# Patient Record
Sex: FEMALE | Race: WHITE | NOT HISPANIC OR LATINO | URBAN - METROPOLITAN AREA
[De-identification: names, ages, dates, MRNs, and addresses within clinical notes are randomized per-mention and may not be internally consistent; named-entity substitution may affect disease eponyms.]

---

## 2017-01-12 ENCOUNTER — COMMUNICATION - HEALTHEAST (OUTPATIENT)
Dept: FAMILY MEDICINE | Facility: CLINIC | Age: 47
End: 2017-01-12

## 2017-01-12 DIAGNOSIS — K21.9 GERD (GASTROESOPHAGEAL REFLUX DISEASE): ICD-10-CM

## 2017-01-23 ENCOUNTER — COMMUNICATION - HEALTHEAST (OUTPATIENT)
Dept: FAMILY MEDICINE | Facility: CLINIC | Age: 47
End: 2017-01-23

## 2017-01-23 DIAGNOSIS — F90.9 ADHD (ATTENTION DEFICIT HYPERACTIVITY DISORDER): ICD-10-CM

## 2017-02-03 ENCOUNTER — COMMUNICATION - HEALTHEAST (OUTPATIENT)
Dept: FAMILY MEDICINE | Facility: CLINIC | Age: 47
End: 2017-02-03

## 2017-02-03 DIAGNOSIS — K21.9 GERD (GASTROESOPHAGEAL REFLUX DISEASE): ICD-10-CM

## 2017-02-09 ENCOUNTER — RECORDS - HEALTHEAST (OUTPATIENT)
Dept: ADMINISTRATIVE | Facility: OTHER | Age: 47
End: 2017-02-09

## 2017-02-14 ENCOUNTER — COMMUNICATION - HEALTHEAST (OUTPATIENT)
Dept: FAMILY MEDICINE | Facility: CLINIC | Age: 47
End: 2017-02-14

## 2017-02-15 ENCOUNTER — COMMUNICATION - HEALTHEAST (OUTPATIENT)
Dept: FAMILY MEDICINE | Facility: CLINIC | Age: 47
End: 2017-02-15

## 2017-02-21 ENCOUNTER — COMMUNICATION - HEALTHEAST (OUTPATIENT)
Dept: FAMILY MEDICINE | Facility: CLINIC | Age: 47
End: 2017-02-21

## 2017-02-21 ENCOUNTER — RECORDS - HEALTHEAST (OUTPATIENT)
Dept: ADMINISTRATIVE | Facility: OTHER | Age: 47
End: 2017-02-21

## 2017-02-21 DIAGNOSIS — F90.9 ADHD (ATTENTION DEFICIT HYPERACTIVITY DISORDER): ICD-10-CM

## 2017-02-23 ENCOUNTER — COMMUNICATION - HEALTHEAST (OUTPATIENT)
Dept: FAMILY MEDICINE | Facility: CLINIC | Age: 47
End: 2017-02-23

## 2017-03-23 ENCOUNTER — COMMUNICATION - HEALTHEAST (OUTPATIENT)
Dept: FAMILY MEDICINE | Facility: CLINIC | Age: 47
End: 2017-03-23

## 2017-03-23 DIAGNOSIS — F90.9 ADHD (ATTENTION DEFICIT HYPERACTIVITY DISORDER): ICD-10-CM

## 2017-04-01 ENCOUNTER — COMMUNICATION - HEALTHEAST (OUTPATIENT)
Dept: SCHEDULING | Facility: CLINIC | Age: 47
End: 2017-04-01

## 2017-04-01 DIAGNOSIS — F90.9 ADHD (ATTENTION DEFICIT HYPERACTIVITY DISORDER): ICD-10-CM

## 2017-04-02 ENCOUNTER — COMMUNICATION - HEALTHEAST (OUTPATIENT)
Dept: FAMILY MEDICINE | Facility: CLINIC | Age: 47
End: 2017-04-02

## 2017-04-02 DIAGNOSIS — K21.9 GERD (GASTROESOPHAGEAL REFLUX DISEASE): ICD-10-CM

## 2017-05-02 ENCOUNTER — COMMUNICATION - HEALTHEAST (OUTPATIENT)
Dept: FAMILY MEDICINE | Facility: CLINIC | Age: 47
End: 2017-05-02

## 2017-05-02 DIAGNOSIS — F90.9 ADHD (ATTENTION DEFICIT HYPERACTIVITY DISORDER): ICD-10-CM

## 2017-05-27 ENCOUNTER — COMMUNICATION - HEALTHEAST (OUTPATIENT)
Dept: SCHEDULING | Facility: CLINIC | Age: 47
End: 2017-05-27

## 2017-05-27 DIAGNOSIS — F90.9 ADHD (ATTENTION DEFICIT HYPERACTIVITY DISORDER): ICD-10-CM

## 2017-06-08 ENCOUNTER — COMMUNICATION - HEALTHEAST (OUTPATIENT)
Dept: FAMILY MEDICINE | Facility: CLINIC | Age: 47
End: 2017-06-08

## 2017-06-29 ENCOUNTER — COMMUNICATION - HEALTHEAST (OUTPATIENT)
Dept: FAMILY MEDICINE | Facility: CLINIC | Age: 47
End: 2017-06-29

## 2017-06-29 DIAGNOSIS — F90.9 ADHD (ATTENTION DEFICIT HYPERACTIVITY DISORDER): ICD-10-CM

## 2017-07-25 ENCOUNTER — COMMUNICATION - HEALTHEAST (OUTPATIENT)
Dept: SCHEDULING | Facility: CLINIC | Age: 47
End: 2017-07-25

## 2017-07-25 DIAGNOSIS — F90.9 ADHD (ATTENTION DEFICIT HYPERACTIVITY DISORDER): ICD-10-CM

## 2017-07-27 ENCOUNTER — COMMUNICATION - HEALTHEAST (OUTPATIENT)
Dept: FAMILY MEDICINE | Facility: CLINIC | Age: 47
End: 2017-07-27

## 2017-07-27 DIAGNOSIS — K21.9 GERD (GASTROESOPHAGEAL REFLUX DISEASE): ICD-10-CM

## 2017-07-31 ENCOUNTER — COMMUNICATION - HEALTHEAST (OUTPATIENT)
Dept: FAMILY MEDICINE | Facility: CLINIC | Age: 47
End: 2017-07-31

## 2017-09-15 ENCOUNTER — OFFICE VISIT - HEALTHEAST (OUTPATIENT)
Dept: FAMILY MEDICINE | Facility: CLINIC | Age: 47
End: 2017-09-15

## 2017-09-15 ENCOUNTER — COMMUNICATION - HEALTHEAST (OUTPATIENT)
Dept: FAMILY MEDICINE | Facility: CLINIC | Age: 47
End: 2017-09-15

## 2017-09-15 DIAGNOSIS — I10 HYPERTENSION: ICD-10-CM

## 2017-09-15 DIAGNOSIS — Z12.31 VISIT FOR SCREENING MAMMOGRAM: ICD-10-CM

## 2017-09-15 DIAGNOSIS — F90.9 ADHD (ATTENTION DEFICIT HYPERACTIVITY DISORDER): ICD-10-CM

## 2017-09-15 DIAGNOSIS — K21.9 GERD (GASTROESOPHAGEAL REFLUX DISEASE): ICD-10-CM

## 2017-09-15 RX ORDER — LISINOPRIL 10 MG/1
10 TABLET ORAL DAILY
Qty: 30 TABLET | Refills: 11 | Status: SHIPPED | OUTPATIENT
Start: 2017-09-15 | End: 2018-09-15

## 2017-09-15 ASSESSMENT — MIFFLIN-ST. JEOR: SCORE: 1258.38

## 2017-09-18 ENCOUNTER — COMMUNICATION - HEALTHEAST (OUTPATIENT)
Dept: FAMILY MEDICINE | Facility: CLINIC | Age: 47
End: 2017-09-18

## 2017-10-04 ENCOUNTER — COMMUNICATION - HEALTHEAST (OUTPATIENT)
Dept: FAMILY MEDICINE | Facility: CLINIC | Age: 47
End: 2017-10-04

## 2017-10-10 ENCOUNTER — COMMUNICATION - HEALTHEAST (OUTPATIENT)
Dept: FAMILY MEDICINE | Facility: CLINIC | Age: 47
End: 2017-10-10

## 2017-10-10 DIAGNOSIS — F90.9 ADHD (ATTENTION DEFICIT HYPERACTIVITY DISORDER): ICD-10-CM

## 2017-10-10 RX ORDER — LISDEXAMFETAMINE DIMESYLATE 50 MG/1
50 CAPSULE ORAL EVERY MORNING
Qty: 30 CAPSULE | Refills: 0 | Status: SHIPPED | OUTPATIENT
Start: 2017-10-10

## 2017-10-13 ENCOUNTER — COMMUNICATION - HEALTHEAST (OUTPATIENT)
Dept: FAMILY MEDICINE | Facility: CLINIC | Age: 47
End: 2017-10-13

## 2017-10-16 ENCOUNTER — RECORDS - HEALTHEAST (OUTPATIENT)
Dept: ADMINISTRATIVE | Facility: OTHER | Age: 47
End: 2017-10-16

## 2017-10-16 ENCOUNTER — COMMUNICATION - HEALTHEAST (OUTPATIENT)
Dept: FAMILY MEDICINE | Facility: CLINIC | Age: 47
End: 2017-10-16

## 2021-05-25 ENCOUNTER — RECORDS - HEALTHEAST (OUTPATIENT)
Dept: ADMINISTRATIVE | Facility: CLINIC | Age: 51
End: 2021-05-25

## 2021-05-26 ENCOUNTER — RECORDS - HEALTHEAST (OUTPATIENT)
Dept: ADMINISTRATIVE | Facility: CLINIC | Age: 51
End: 2021-05-26

## 2021-05-27 ENCOUNTER — RECORDS - HEALTHEAST (OUTPATIENT)
Dept: ADMINISTRATIVE | Facility: CLINIC | Age: 51
End: 2021-05-27

## 2021-05-29 ENCOUNTER — RECORDS - HEALTHEAST (OUTPATIENT)
Dept: ADMINISTRATIVE | Facility: CLINIC | Age: 51
End: 2021-05-29

## 2021-05-31 VITALS — BODY MASS INDEX: 22.66 KG/M2 | HEIGHT: 66 IN | WEIGHT: 141 LBS

## 2021-06-02 ENCOUNTER — RECORDS - HEALTHEAST (OUTPATIENT)
Dept: ADMINISTRATIVE | Facility: CLINIC | Age: 51
End: 2021-06-02

## 2021-06-13 NOTE — PROGRESS NOTES
Assessment:     1. Visit for screening mammogram  Mammo Screening Bilateral   2. GERD (gastroesophageal reflux disease)  omeprazole (PRILOSEC) 20 MG capsule   3. ADHD (attention deficit hyperactivity disorder)  lisdexamfetamine (VYVANSE) 50 MG capsule   4. Hypertension  lisinopril (PRINIVIL,ZESTRIL) 10 MG tablet       Plan:     1. GERD (gastroesophageal reflux disease)  Patient is continue to take the following  - omeprazole (PRILOSEC) 20 MG capsule; TAKE ONE CAPSULE BY MOUTH TWICE DAILY  Dispense: 90 capsule; Refill: 3    2. ADHD (attention deficit hyperactivity disorder)  Renew the following medication she is doing quite well especially with regards to her job performance  - lisdexamfetamine (VYVANSE) 50 MG capsule; Take 1 capsule (50 mg total) by mouth every morning.  Dispense: 30 capsule; Refill: 0    3. Visit for screening mammogram  Following will be scheduled  - Mammo Screening Bilateral; Future    4. Hypertension  Instituted new therapy with lisinopril 10 mg once daily recheck twice weekly see if no improvement in 2 weeks otherwise I will see her for follow-up 3 months  - lisinopril (PRINIVIL,ZESTRIL) 10 MG tablet; Take 1 tablet (10 mg total) by mouth daily.  Dispense: 30 tablet; Refill: 11      Subjective:   Patient is being seen for follow-up of her ADHD which is under control psychologist.  She has seen medication management here and we have all approved the use of Vyvanse 50 mg which helps her with her job performance.  Patient is under a lot of stress due to problems with her son who has been suicidal recently.  We did discuss this at this visit.  Patient in the past when she weighed a lot more was taking lisinopril for benign essential hypertension but since weight loss blood pressure has been normal but now is back up she denies any headaches visual problems dysphagia shortness of breath dyspnea chest pain angina.  The patient wants to run a 5K tomorrow I said she may walk at.  Medical decision making  "was to reinstitute therapy with lisinopril 10 mg once daily.  Patient was again cautioned about the use of tobacco.  She will do the best she can.  She is to take her blood pressure twice weekly at a local drugstore and keep an eye on her systolic and diastolic blood pressures see if no improvement sooner than 3 months.  All medical questions that were asked were answered I personally reviewed family social history    Review of Systems: A complete 14 point review of systems was obtained and is negative or as stated in the history of present illness.    Past Medical History:   Diagnosis Date     Hypertension      No family history on file.  Past Surgical History:   Procedure Laterality Date     BARIATRIC SURGERY  2014    with gastric sleeve     BLADDER SURGERY      lift     CHOLECYSTECTOMY       OK LAP,VAG HYST,UTERUS 250GMS/< N/A 3/20/2015    Procedure:  TOTAL LAPAROSCOPIC HYSTERECTOMY, BILATERAL SALPINGECTOMY, CYSTOSCOPY;  Surgeon: Manish Centeno MD;  Location: [facility];  Service: Gynecology     Social History   Substance Use Topics     Smoking status: Current Every Day Smoker     Packs/day: 0.50     Years: 25.00     Types: Cigarettes     Smokeless tobacco: Never Used     Alcohol use No         Objective:   BP (!) 176/112  Pulse 85  Ht 5' 5.5\" (1.664 m)  Wt 141 lb (64 kg)  SpO2 99%  BMI 23.11 kg/m2    General Appearance:  Alert, cooperative, no distress  Head:  Normocephalic, no obvious abnormality  Ears: TM anatomy normal  Eyes:  PERRL, EOM's intact, conjunctiva and corneas clear  Nose:  Nares symmetrical, septum midline, mucosa pink, no sinus tenderness  Throat:  Lips, tongue, and mucosa are moist, pink, and intact  Neck:  Supple, symmetrical, trachea midline, no adenopathy; thyroid: no enlargement, symmetric,no tenderness/mass/nodules; no carotid bruit, no JVD  Back:  Symmetrical, no curvature, ROM normal, no CVA tenderness  Chest/Breast:  No mass or tenderness  Lungs:  Clear to auscultation " bilaterally, respirations unlabored   Heart:  Normal PMI, regular rate & rhythm, S1 and S2 normal, no murmurs, rubs, or gallops  Abdomen:  Soft, non-tender, bowel sounds active all four quadrants, no mass, or organomegaly  Musculoskeletal:  Tone and strength strong and symmetrical, all extremities  Lymphatic:  No adenopathy  Skin/Hair/Nails:  Skin warm, dry, and intact, no rashes  Neurologic:  Alert and oriented x3, no cranial nerve deficits, normal strength and tone, gait steady  Extremities:  No edema.  Juli's sign negative.    Genitourinary: deferred  Pulses:  Equal bilaterally     The following high BMI interventions were performed this visit: encouragement to exercise      This note has been dictated using voice recognition software. Any grammatical or context distortions are unintentional and inherent to the the software.

## 2021-07-03 NOTE — ADDENDUM NOTE
Addendum Note by Miguel Barrow CMA at 4/6/2017 10:08 AM     Author: Miguel Barrow CMA Service: -- Author Type: Certified Medical Assistant    Filed: 4/6/2017 10:08 AM Encounter Date: 2/3/2017 Status: Signed    : Miguel Barrow CMA (Certified Medical Assistant)    Addended by: MIGUEL BARROW on: 4/6/2017 10:08 AM        Modules accepted: Orders